# Patient Record
Sex: MALE | Race: WHITE | NOT HISPANIC OR LATINO | Employment: OTHER | URBAN - METROPOLITAN AREA
[De-identification: names, ages, dates, MRNs, and addresses within clinical notes are randomized per-mention and may not be internally consistent; named-entity substitution may affect disease eponyms.]

---

## 2019-03-01 ENCOUNTER — TRANSCRIBE ORDERS (OUTPATIENT)
Dept: ADMINISTRATIVE | Facility: HOSPITAL | Age: 49
End: 2019-03-01

## 2019-03-01 DIAGNOSIS — R94.39 ABNORMAL STRESS TEST: Primary | ICD-10-CM

## 2019-03-04 ENCOUNTER — HOSPITAL ENCOUNTER (OUTPATIENT)
Facility: HOSPITAL | Age: 49
Setting detail: HOSPITAL OUTPATIENT SURGERY
Discharge: HOME | End: 2019-03-04
Attending: INTERNAL MEDICINE | Admitting: INTERNAL MEDICINE

## 2019-03-04 VITALS
HEIGHT: 66 IN | DIASTOLIC BLOOD PRESSURE: 99 MMHG | BODY MASS INDEX: 32.95 KG/M2 | RESPIRATION RATE: 16 BRPM | OXYGEN SATURATION: 96 % | TEMPERATURE: 98 F | HEART RATE: 88 BPM | SYSTOLIC BLOOD PRESSURE: 129 MMHG | WEIGHT: 205.03 LBS

## 2019-03-04 DIAGNOSIS — R94.39 ABNORMAL STRESS TEST: ICD-10-CM

## 2019-03-04 LAB
ANION GAP SERPL CALCULATED.3IONS-SCNC: 9 MMOL/L (ref 3–11)
BUN BLD-MCNC: 14 MG/DL (ref 9–23)
BUN/CREAT SERPL: 13.3 (ref 7–25)
CALCIUM SPEC-SCNC: 9.8 MG/DL (ref 8.7–10.4)
CHLORIDE SERPL-SCNC: 100 MMOL/L (ref 99–109)
CO2 SERPL-SCNC: 28 MMOL/L (ref 20–31)
CREAT BLD-MCNC: 1.05 MG/DL (ref 0.6–1.3)
DEPRECATED RDW RBC AUTO: 41.1 FL (ref 37–54)
ERYTHROCYTE [DISTWIDTH] IN BLOOD BY AUTOMATED COUNT: 12.9 % (ref 11.3–14.5)
GFR SERPL CREATININE-BSD FRML MDRD: 74 ML/MIN/1.73
GLUCOSE BLD-MCNC: 113 MG/DL (ref 70–100)
GLUCOSE BLDC GLUCOMTR-MCNC: 101 MG/DL (ref 70–130)
HCT VFR BLD AUTO: 48.3 % (ref 38.9–50.9)
HGB BLD-MCNC: 16.1 G/DL (ref 13.1–17.5)
MCH RBC QN AUTO: 29.1 PG (ref 27–31)
MCHC RBC AUTO-ENTMCNC: 33.3 G/DL (ref 32–36)
MCV RBC AUTO: 87.3 FL (ref 80–99)
PLATELET # BLD AUTO: 268 10*3/MM3 (ref 150–450)
PMV BLD AUTO: 9.8 FL (ref 6–12)
POTASSIUM BLD-SCNC: 3.7 MMOL/L (ref 3.5–5.5)
RBC # BLD AUTO: 5.53 10*6/MM3 (ref 4.2–5.76)
SODIUM BLD-SCNC: 137 MMOL/L (ref 132–146)
WBC NRBC COR # BLD: 5.66 10*3/MM3 (ref 3.5–10.8)

## 2019-03-04 PROCEDURE — 25010000002 HEPARIN (PORCINE) PER 1000 UNITS: Performed by: INTERNAL MEDICINE

## 2019-03-04 PROCEDURE — 93005 ELECTROCARDIOGRAM TRACING: CPT | Performed by: INTERNAL MEDICINE

## 2019-03-04 PROCEDURE — C1894 INTRO/SHEATH, NON-LASER: HCPCS | Performed by: INTERNAL MEDICINE

## 2019-03-04 PROCEDURE — 80048 BASIC METABOLIC PNL TOTAL CA: CPT | Performed by: INTERNAL MEDICINE

## 2019-03-04 PROCEDURE — 36415 COLL VENOUS BLD VENIPUNCTURE: CPT

## 2019-03-04 PROCEDURE — 0 IOPAMIDOL PER 1 ML: Performed by: INTERNAL MEDICINE

## 2019-03-04 PROCEDURE — C1769 GUIDE WIRE: HCPCS | Performed by: INTERNAL MEDICINE

## 2019-03-04 PROCEDURE — 85027 COMPLETE CBC AUTOMATED: CPT | Performed by: INTERNAL MEDICINE

## 2019-03-04 PROCEDURE — 25010000002 FENTANYL CITRATE (PF) 100 MCG/2ML SOLUTION: Performed by: INTERNAL MEDICINE

## 2019-03-04 PROCEDURE — 25010000002 MIDAZOLAM PER 1 MG: Performed by: INTERNAL MEDICINE

## 2019-03-04 PROCEDURE — 93458 L HRT ARTERY/VENTRICLE ANGIO: CPT | Performed by: INTERNAL MEDICINE

## 2019-03-04 PROCEDURE — 82962 GLUCOSE BLOOD TEST: CPT

## 2019-03-04 RX ORDER — NICOTINE POLACRILEX 4 MG
15 LOZENGE BUCCAL
Status: DISCONTINUED | OUTPATIENT
Start: 2019-03-04 | End: 2019-03-04 | Stop reason: HOSPADM

## 2019-03-04 RX ORDER — SODIUM CHLORIDE 9 MG/ML
250 INJECTION, SOLUTION INTRAVENOUS CONTINUOUS
Status: ACTIVE | OUTPATIENT
Start: 2019-03-04 | End: 2019-03-04

## 2019-03-04 RX ORDER — LIDOCAINE HYDROCHLORIDE 10 MG/ML
INJECTION, SOLUTION EPIDURAL; INFILTRATION; INTRACAUDAL; PERINEURAL AS NEEDED
Status: DISCONTINUED | OUTPATIENT
Start: 2019-03-04 | End: 2019-03-04 | Stop reason: HOSPADM

## 2019-03-04 RX ORDER — ASPIRIN 81 MG/1
324 TABLET ORAL DAILY
COMMUNITY

## 2019-03-04 RX ORDER — MINOCYCLINE HYDROCHLORIDE 100 MG/1
100 CAPSULE ORAL EVERY MORNING
COMMUNITY

## 2019-03-04 RX ORDER — ASCORBIC ACID 125 MG
1 TABLET,CHEWABLE ORAL DAILY
COMMUNITY

## 2019-03-04 RX ORDER — VITAMIN B COMPLEX
2 CAPSULE ORAL DAILY
COMMUNITY

## 2019-03-04 RX ORDER — MELOXICAM 15 MG/1
15 TABLET ORAL EVERY MORNING
COMMUNITY

## 2019-03-04 RX ORDER — ACETAMINOPHEN 325 MG/1
650 TABLET ORAL EVERY 4 HOURS PRN
Status: DISCONTINUED | OUTPATIENT
Start: 2019-03-04 | End: 2019-03-04 | Stop reason: HOSPADM

## 2019-03-04 RX ORDER — HYDROCODONE BITARTRATE AND ACETAMINOPHEN 5; 325 MG/1; MG/1
1 TABLET ORAL EVERY 4 HOURS PRN
Status: DISCONTINUED | OUTPATIENT
Start: 2019-03-04 | End: 2019-03-04 | Stop reason: HOSPADM

## 2019-03-04 RX ORDER — ALPRAZOLAM 0.25 MG/1
0.25 TABLET ORAL 3 TIMES DAILY PRN
Status: DISCONTINUED | OUTPATIENT
Start: 2019-03-04 | End: 2019-03-04 | Stop reason: HOSPADM

## 2019-03-04 RX ORDER — TEMAZEPAM 7.5 MG/1
7.5 CAPSULE ORAL NIGHTLY PRN
Status: DISCONTINUED | OUTPATIENT
Start: 2019-03-04 | End: 2019-03-04 | Stop reason: HOSPADM

## 2019-03-04 RX ORDER — ASPIRIN 325 MG
325 TABLET, DELAYED RELEASE (ENTERIC COATED) ORAL DAILY
Status: DISCONTINUED | OUTPATIENT
Start: 2019-03-04 | End: 2019-03-04 | Stop reason: HOSPADM

## 2019-03-04 RX ORDER — MORPHINE SULFATE 2 MG/ML
1 INJECTION, SOLUTION INTRAMUSCULAR; INTRAVENOUS EVERY 4 HOURS PRN
Status: DISCONTINUED | OUTPATIENT
Start: 2019-03-04 | End: 2019-03-04 | Stop reason: HOSPADM

## 2019-03-04 RX ORDER — FENTANYL CITRATE 50 UG/ML
INJECTION, SOLUTION INTRAMUSCULAR; INTRAVENOUS AS NEEDED
Status: DISCONTINUED | OUTPATIENT
Start: 2019-03-04 | End: 2019-03-04 | Stop reason: HOSPADM

## 2019-03-04 RX ORDER — DEXTROSE MONOHYDRATE 25 G/50ML
25 INJECTION, SOLUTION INTRAVENOUS
Status: DISCONTINUED | OUTPATIENT
Start: 2019-03-04 | End: 2019-03-04 | Stop reason: HOSPADM

## 2019-03-04 RX ORDER — FLUTICASONE PROPIONATE 50 MCG
2 SPRAY, SUSPENSION (ML) NASAL EVERY MORNING
COMMUNITY

## 2019-03-04 RX ORDER — OXYMETAZOLINE HYDROCHLORIDE 0.05 G/100ML
2 SPRAY NASAL 2 TIMES DAILY PRN
COMMUNITY

## 2019-03-04 RX ORDER — NALOXONE HCL 0.4 MG/ML
0.4 VIAL (ML) INJECTION
Status: DISCONTINUED | OUTPATIENT
Start: 2019-03-04 | End: 2019-03-04 | Stop reason: HOSPADM

## 2019-03-04 RX ORDER — ROSUVASTATIN CALCIUM 40 MG/1
40 TABLET, COATED ORAL NIGHTLY
COMMUNITY

## 2019-03-04 RX ORDER — NITROGLYCERIN 0.4 MG/1
0.4 TABLET SUBLINGUAL
COMMUNITY

## 2019-03-04 RX ORDER — MONTELUKAST SODIUM 10 MG/1
10 TABLET ORAL EVERY MORNING
COMMUNITY

## 2019-03-04 RX ORDER — AMLODIPINE BESYLATE 5 MG/1
5 TABLET ORAL EVERY MORNING
COMMUNITY

## 2019-03-04 RX ORDER — OMEPRAZOLE 20 MG/1
20 CAPSULE, DELAYED RELEASE ORAL EVERY MORNING
COMMUNITY

## 2019-03-04 RX ORDER — AZELASTINE 1 MG/ML
2 SPRAY, METERED NASAL 2 TIMES DAILY
COMMUNITY

## 2019-03-04 RX ORDER — MIDAZOLAM HYDROCHLORIDE 1 MG/ML
INJECTION INTRAMUSCULAR; INTRAVENOUS AS NEEDED
Status: DISCONTINUED | OUTPATIENT
Start: 2019-03-04 | End: 2019-03-04 | Stop reason: HOSPADM

## 2019-03-04 RX ORDER — HYDROCHLOROTHIAZIDE 25 MG/1
25 TABLET ORAL EVERY MORNING
COMMUNITY

## 2019-03-04 RX ORDER — IPRATROPIUM BROMIDE 42 UG/1
2 SPRAY, METERED NASAL AS NEEDED
COMMUNITY

## 2019-03-04 RX ADMIN — ASPIRIN 325 MG: 325 TABLET, COATED ORAL at 12:27

## 2019-03-04 NOTE — H&P
Pre-Cardiac Catheterization Report  Cardiovascular Laboratory  [facility]      Patient:  Familia Brothers  :  1970  PCP:  Rob Magana MD  PHONE:  [phone]    DATE: 3/4/2019    BRIEF HPI:  Familia Brothers is a 49 y.o. male with hypertension, hypercholesterolemia, diabetes mellitus, and a family history of premature coronary artery disease.  He is complaining of a one year history of chest pain which he describes as a dull ache.  He states that it is been occurring weekly is moderate in severity lasting minutes and associated with shortness of breath and nausea.  He recently underwent an abnormal stress test and now presents for left heart catheterization with possible intervention.    Cardiac Risk Factors:  advanced age (older than 55 for men, 65 for women), diabetes mellitus, dyslipidemia, family history of premature cardiovascular disease, hypertension, male gender, obesity (BMI >= 30 kg/m2)    Anginal class in last 2 weeks:  CCS class III    CHF Class in last 2 weeks:  NYHA Class II    Cardiogenic shock:  no    Cardiac arrest <24 hours:  no    Stress test within last 6 months:   yes   Details:    Previous cardiac catheterization:  no  Details:     Previous CABG:  no  Details:      Allergies:     IV contrast allergy:  no  No Known Allergies    MEDICATIONS:  Prior to Admission medications    Medication Sig Start Date End Date Taking? Authorizing Provider   Acetaminophen (TYLENOL EXTRA STRENGTH PO) Take 500 mg by mouth As Needed.   Yes Neal Salinas MD   amLODIPine (NORVASC) 5 MG tablet Take 5 mg by mouth Every Morning.   Yes Neal Salinas MD   aspirin 81 MG EC tablet Take 324 mg by mouth Daily.   Yes Neal Salinas MD   B Complex Vitamins (VITAMIN B COMPLEX) capsule capsule Take 2 capsules by mouth Daily.   Yes Neal Salinas MD   Cyanocobalamin (B-12) 5000 MCG capsule Take 1 capsule by mouth Daily.   Yes Neal Salinas MD   fluticasone (FLONASE)  50 MCG/ACT nasal spray 2 sprays into the nostril(s) as directed by provider Every Morning.   Yes Neal Salinas MD   hydrochlorothiazide (HYDRODIURIL) 25 MG tablet Take 25 mg by mouth Every Morning.   Yes Neal Salinas MD   ipratropium (ATROVENT) 0.06 % nasal spray 2 sprays into the nostril(s) as directed by provider As Needed for Rhinitis.   Yes Neal Salinas MD   meloxicam (MOBIC) 15 MG tablet Take 15 mg by mouth Every Morning.   Yes Neal Salinas MD   metFORMIN (GLUCOPHAGE) 500 MG tablet Take 500 mg by mouth 2 (Two) Times a Day With Meals.   Yes Neal Salinas MD   minocycline (MINOCIN,DYNACIN) 100 MG capsule Take 100 mg by mouth Every Morning.   Yes Neal Salinas MD   montelukast (SINGULAIR) 10 MG tablet Take 10 mg by mouth Every Morning.   Yes Neal Salinas MD   Multiple Vitamins-Minerals (VITAMIN D3 COMPLETE PO) Take 6,000 Units by mouth Every Morning.   Yes Neal Salinas MD   omeprazole (priLOSEC) 20 MG capsule Take 20 mg by mouth Every Morning.   Yes Neal Salinas MD   oxymetazoline (AFRIN) 0.05 % nasal spray 2 sprays into the nostril(s) as directed by provider 2 (Two) Times a Day As Needed for Congestion.   Yes Neal Salinas MD   rosuvastatin (CRESTOR) 40 MG tablet Take 40 mg by mouth Every Night.   Yes Neal Salinas MD   nitroglycerin (NITROSTAT) 0.4 MG SL tablet Place 0.4 mg under the tongue Every 5 (Five) Minutes As Needed for Chest Pain. Take no more than 3 doses in 15 minutes.    Neal Salinas MD       Past medical & surgical history, social and family history reviewed in the electronic medical record.    ROS:  Cardiovascular ROS: positive for - chest pain, dyspnea on exertion and shortness of breath    Physical Exam:    Vitals:   Vitals:    03/04/19 1149   BP: 138/97   Pulse:    Resp:    Temp:    SpO2:           03/04/19  1148   Weight: 93 kg (205 lb 0.4 oz)       General Appearance:    Alert, cooperative, in  no acute distress   Head:    Normocephalic, without obvious abnormality, atraumatic   Eyes:            Lids and lashes normal, conjunctivae and sclerae normal, no   icterus, no pallor, corneas clear, PERRLA   Ears:    Ears appear intact with no abnormalities noted   Neck:   No adenopathy, supple, trachea midline, no thyromegaly, no   carotid bruit, no JVD   Back:     No kyphosis present, no scoliosis present, range of motion normal   Lungs:     Clear to auscultation,respirations regular, even and                  unlabored    Heart:    Regular rhythm and normal rate, normal S1 and S2, no            murmur, no gallop, no rub, no click   Chest Wall:    No abnormalities observed   Abdomen:     Normal bowel sounds, no masses, no organomegaly, soft        non-tender, non-distended, no guarding, no rebound                tenderness   Rectal:     Deferred   Extremities:   Moves all extremities well, no edema, no cyanosis, no             redness   Pulses:   Pulses palpable and equal bilaterally   Skin:   No bleeding, bruising or rash   Neurologic:   Cranial nerves 2 - 12 grossly intact, sensation intact     Barbaeu Test:  Left: Normal  (oxymetric Allens) Right: Not Assessed             No results found for: CHLPL, TRIG, HDL, LDLDIRECT, AST, ALT        Impression      · Abnormal stress test    Plan     · Procedure to perform: [facility]  · Planned access: Left radial artery              ARABELLA Dhillon  03/04/19  12:20 PM

## 2023-07-10 PROBLEM — J44.9 CHRONIC OBSTRUCTIVE LUNG DISEASE: Status: ACTIVE | Noted: 2023-05-09

## 2023-07-10 PROBLEM — K85.90 ACUTE PANCREATITIS: Status: ACTIVE | Noted: 2023-07-10

## 2023-07-10 PROBLEM — I10 HYPERTENSIVE DISORDER: Status: ACTIVE | Noted: 2023-05-09

## 2023-07-10 PROBLEM — Z99.89 OSA ON CPAP: Status: ACTIVE | Noted: 2023-07-10

## 2023-07-10 PROBLEM — E11.9 DIABETES MELLITUS: Status: ACTIVE | Noted: 2023-05-09

## 2023-07-10 PROBLEM — K21.9 GASTROESOPHAGEAL REFLUX DISEASE WITHOUT ESOPHAGITIS: Status: ACTIVE | Noted: 2023-05-09

## 2023-07-10 PROBLEM — G47.33 OSA ON CPAP: Status: ACTIVE | Noted: 2023-07-10

## 2023-07-10 PROBLEM — F10.10 ALCOHOL ABUSE: Status: ACTIVE | Noted: 2023-07-10

## 2023-07-10 PROBLEM — F41.1 GENERALIZED ANXIETY DISORDER: Status: ACTIVE | Noted: 2023-05-09

## 2023-07-10 PROBLEM — E78.5 HYPERLIPIDEMIA: Status: ACTIVE | Noted: 2023-05-09

## 2023-08-17 ENCOUNTER — OFFICE VISIT (OUTPATIENT)
Dept: INTERNAL MEDICINE | Facility: CLINIC | Age: 53
End: 2023-08-17
Payer: MEDICARE

## 2023-08-17 ENCOUNTER — LAB (OUTPATIENT)
Dept: INTERNAL MEDICINE | Facility: CLINIC | Age: 53
End: 2023-08-17
Payer: MEDICARE

## 2023-08-17 VITALS
HEART RATE: 88 BPM | TEMPERATURE: 97.5 F | DIASTOLIC BLOOD PRESSURE: 76 MMHG | BODY MASS INDEX: 31.72 KG/M2 | HEIGHT: 66 IN | SYSTOLIC BLOOD PRESSURE: 108 MMHG | WEIGHT: 197.4 LBS | OXYGEN SATURATION: 97 %

## 2023-08-17 DIAGNOSIS — Z12.11 SCREEN FOR COLON CANCER: ICD-10-CM

## 2023-08-17 DIAGNOSIS — E78.2 MIXED HYPERLIPIDEMIA: Primary | ICD-10-CM

## 2023-08-17 DIAGNOSIS — E11.9 ENCOUNTER FOR DIABETIC FOOT EXAM: ICD-10-CM

## 2023-08-17 DIAGNOSIS — Z12.11 ENCOUNTER FOR SCREENING COLONOSCOPY: ICD-10-CM

## 2023-08-17 DIAGNOSIS — L98.9 SKIN LESIONS: ICD-10-CM

## 2023-08-17 DIAGNOSIS — F41.8 MIXED ANXIETY AND DEPRESSIVE DISORDER: ICD-10-CM

## 2023-08-17 DIAGNOSIS — K21.9 GASTROESOPHAGEAL REFLUX DISEASE WITHOUT ESOPHAGITIS: ICD-10-CM

## 2023-08-17 DIAGNOSIS — Z00.00 HEALTH CARE MAINTENANCE: ICD-10-CM

## 2023-08-17 DIAGNOSIS — E11.9 TYPE 2 DIABETES MELLITUS WITHOUT COMPLICATION, WITHOUT LONG-TERM CURRENT USE OF INSULIN: ICD-10-CM

## 2023-08-17 DIAGNOSIS — G47.33 OSA (OBSTRUCTIVE SLEEP APNEA): ICD-10-CM

## 2023-08-17 LAB — HCV AB SER DONR QL: NORMAL

## 2023-08-17 PROCEDURE — 83036 HEMOGLOBIN GLYCOSYLATED A1C: CPT | Performed by: PHYSICIAN ASSISTANT

## 2023-08-17 PROCEDURE — 80053 COMPREHEN METABOLIC PANEL: CPT | Performed by: PHYSICIAN ASSISTANT

## 2023-08-17 PROCEDURE — 86803 HEPATITIS C AB TEST: CPT | Performed by: PHYSICIAN ASSISTANT

## 2023-08-17 PROCEDURE — 80061 LIPID PANEL: CPT | Performed by: PHYSICIAN ASSISTANT

## 2023-08-17 PROCEDURE — 85027 COMPLETE CBC AUTOMATED: CPT | Performed by: PHYSICIAN ASSISTANT

## 2023-08-17 PROCEDURE — 36415 COLL VENOUS BLD VENIPUNCTURE: CPT | Performed by: PHYSICIAN ASSISTANT

## 2023-08-17 PROCEDURE — 84443 ASSAY THYROID STIM HORMONE: CPT | Performed by: PHYSICIAN ASSISTANT

## 2023-08-17 PROCEDURE — 82043 UR ALBUMIN QUANTITATIVE: CPT | Performed by: PHYSICIAN ASSISTANT

## 2023-08-17 RX ORDER — BLOOD SUGAR DIAGNOSTIC
STRIP MISCELLANEOUS
COMMUNITY
Start: 2023-08-02

## 2023-08-17 RX ORDER — CHOLECALCIFEROL (VITAMIN D3) 125 MCG
5 CAPSULE ORAL
COMMUNITY

## 2023-08-17 RX ORDER — GLIMEPIRIDE 2 MG/1
2 TABLET ORAL
COMMUNITY
End: 2023-08-21 | Stop reason: SDUPTHER

## 2023-08-17 RX ORDER — BUSPIRONE HYDROCHLORIDE 5 MG/1
5 TABLET ORAL 3 TIMES DAILY PRN
Qty: 90 TABLET | Refills: 2 | Status: SHIPPED | OUTPATIENT
Start: 2023-08-17

## 2023-08-17 RX ORDER — TELMISARTAN 40 MG/1
1 TABLET ORAL DAILY
COMMUNITY

## 2023-08-17 RX ORDER — OMEPRAZOLE 40 MG/1
CAPSULE, DELAYED RELEASE ORAL
COMMUNITY

## 2023-08-18 LAB
ALBUMIN SERPL-MCNC: 4.8 G/DL (ref 3.5–5.2)
ALBUMIN UR-MCNC: <1.2 MG/DL
ALBUMIN/GLOB SERPL: 1.8 G/DL
ALP SERPL-CCNC: 48 U/L (ref 39–117)
ALT SERPL W P-5'-P-CCNC: 34 U/L (ref 1–41)
ANION GAP SERPL CALCULATED.3IONS-SCNC: 11.2 MMOL/L (ref 5–15)
AST SERPL-CCNC: 29 U/L (ref 1–40)
BILIRUB SERPL-MCNC: 0.3 MG/DL (ref 0–1.2)
BUN SERPL-MCNC: 13 MG/DL (ref 6–20)
BUN/CREAT SERPL: 15.3 (ref 7–25)
CALCIUM SPEC-SCNC: 10.1 MG/DL (ref 8.6–10.5)
CHLORIDE SERPL-SCNC: 102 MMOL/L (ref 98–107)
CHOLEST SERPL-MCNC: 170 MG/DL (ref 0–200)
CO2 SERPL-SCNC: 25.8 MMOL/L (ref 22–29)
CREAT SERPL-MCNC: 0.85 MG/DL (ref 0.76–1.27)
DEPRECATED RDW RBC AUTO: 39.6 FL (ref 37–54)
EGFRCR SERPLBLD CKD-EPI 2021: 102.6 ML/MIN/1.73
ERYTHROCYTE [DISTWIDTH] IN BLOOD BY AUTOMATED COUNT: 13 % (ref 12.3–15.4)
GLOBULIN UR ELPH-MCNC: 2.7 GM/DL
GLUCOSE SERPL-MCNC: 126 MG/DL (ref 65–99)
HBA1C MFR BLD: 7 % (ref 4.8–5.6)
HCT VFR BLD AUTO: 46.2 % (ref 37.5–51)
HDLC SERPL-MCNC: 38 MG/DL (ref 40–60)
HGB BLD-MCNC: 15.6 G/DL (ref 13–17.7)
LDLC SERPL CALC-MCNC: 89 MG/DL (ref 0–100)
LDLC/HDLC SERPL: 2.12 {RATIO}
MCH RBC QN AUTO: 28.9 PG (ref 26.6–33)
MCHC RBC AUTO-ENTMCNC: 33.8 G/DL (ref 31.5–35.7)
MCV RBC AUTO: 85.6 FL (ref 79–97)
PLATELET # BLD AUTO: 250 10*3/MM3 (ref 140–450)
PMV BLD AUTO: 10.2 FL (ref 6–12)
POTASSIUM SERPL-SCNC: 4.4 MMOL/L (ref 3.5–5.2)
PROT SERPL-MCNC: 7.5 G/DL (ref 6–8.5)
RBC # BLD AUTO: 5.4 10*6/MM3 (ref 4.14–5.8)
SODIUM SERPL-SCNC: 139 MMOL/L (ref 136–145)
TRIGL SERPL-MCNC: 258 MG/DL (ref 0–150)
TSH SERPL DL<=0.05 MIU/L-ACNC: 0.83 UIU/ML (ref 0.27–4.2)
VLDLC SERPL-MCNC: 43 MG/DL (ref 5–40)
WBC NRBC COR # BLD: 4.98 10*3/MM3 (ref 3.4–10.8)

## 2023-08-21 RX ORDER — GLIMEPIRIDE 4 MG/1
4 TABLET ORAL
Qty: 90 TABLET | Refills: 1 | Status: SHIPPED | OUTPATIENT
Start: 2023-08-21

## 2023-08-29 NOTE — PROGRESS NOTES
Sleep Clinic Video Visit Consult Note    You have chosen to receive care through a telehealth visit.  Do you consent to use a video/audio connection for your medical care today? Yes     Chief Complaint  Sleep problem    Subjective     History of Present Illness:  Familia Brothers is a 53 y.o. male with a history of hypertension, hyperlipidemia, diabetes mellitus, pancreatitis, arthritis, and depression.  The patient is referred by Tio Gallegos PA-C.  He recently reported a history of ALMA not on PAP therapy. He was diagnosed several years ago having been seen at [facility]. He did try a machine but had difficulty with it.     Further details are as follows:    Randolph Scale is (out of 24):       Estimated average amount of sleep per night: 5-6 hours  Number of times he wakes up at night: 7-8 times due to arthritis  Difficulty falling back asleep: yes  It usually takes 60 minutes to go to sleep.  He feels sleepy upon waking up: yes  Rotating or night shift work: no    Drowsiness/Sleepiness:  He exhibits the following:  excessive daytime sleepiness  excessive daytime fatigue    Snoring/Breathing:  He exhibits the following:  loud snoring  snores in all sleep positions  awoken with dry mouth  quits breathing at night    Head Injury:  He exhibits the following:  No    Reflux:  He describes the following:  takes medication for reflux    Narcolepsy:  He exhibits the following:  none    RLS/PLMs:  He describes the following:  moves or jerks during sleep  discomfort in legs with an urge to move them    Insomnia:  He describes the following:  problems initiating sleep at night  frequent awakenings  bothered by pain at night    Parasomnia:  He exhibits the following:  grinds teeth    Weight:  Weight change in the last year:  loss: 15 lbs    The patient's relevant past medical, surgical, family, and social history reviewed and updated in Epic as appropriate.    Review of Systems    Objective   Vital Signs:  Ht 170.2 cm  "(67\")   Wt 87.1 kg (192 lb)   BMI 30.07 kg/mý     BMI is >= 30 and <35. (Class 1 Obesity). The following options were offered after discussion;: he has difficulty with exercise but is watching diet.        Physical Exam  Constitutional:       Appearance: Normal appearance.   Neurological:      Mental Status: He is alert and oriented to person, place, and time.   Psychiatric:         Mood and Affect: Mood normal.         Behavior: Behavior normal.         Thought Content: Thought content normal.         Judgment: Judgment normal.           Result Review :              Assessment and Plan  Familia Brothers is a 53 y.o. male who presents for further evaluation of excessive daytime and sleepiness and fatigue, nonrestorative sleep, and concerns for sleep disordered breathing and obstructive sleep apnea.  Patient has a remote history of sleep apnea diagnosis for which he was prescribed PAP therapy.  He was unable to tolerate it at that time.  His symptoms continue to be concerning for sleep disordered breathing obstructive sleep apnea.  We will obtain a home sleep test for further evaluation.  The patient will return for follow-up and recommendations after test.  I have discussed weight loss as it pertains to obstructive sleep apnea.    Diagnoses and all orders for this visit:    1. Snoring (Primary)  -     Home Sleep Study; Future    2. Suspected sleep apnea  -     Home Sleep Study; Future    3. Excessive daytime sleepiness  -     Home Sleep Study; Future    4. Primary hypertension    5. Obesity (BMI 30.0-34.9)                 I discussed the consequences of uncontrolled sleep apnea including hypertension, heart disease, diabetes, stroke, and dementia. I further discussed sleep apnea therapeutic options including CPAP, Weight loss, Oral dental appliance, and surgery.         Follow Up  Return for Follow up after study.  Patient was given instructions and counseling regarding his condition or for health maintenance " advice. Please see specific information pulled into the AVS if appropriate.     ED Briones, ACNP-BC  Pulmonary, Critical Care Medicine, and Sleep Medicine

## 2023-08-31 ENCOUNTER — TELEMEDICINE (OUTPATIENT)
Dept: SLEEP MEDICINE | Facility: HOSPITAL | Age: 53
End: 2023-08-31
Payer: MEDICARE

## 2023-08-31 VITALS — WEIGHT: 192 LBS | BODY MASS INDEX: 30.13 KG/M2 | HEIGHT: 67 IN

## 2023-08-31 DIAGNOSIS — R06.83 SNORING: Primary | ICD-10-CM

## 2023-08-31 DIAGNOSIS — I10 PRIMARY HYPERTENSION: ICD-10-CM

## 2023-08-31 DIAGNOSIS — G47.19 EXCESSIVE DAYTIME SLEEPINESS: ICD-10-CM

## 2023-08-31 DIAGNOSIS — E66.9 OBESITY (BMI 30.0-34.9): ICD-10-CM

## 2023-08-31 DIAGNOSIS — R29.818 SUSPECTED SLEEP APNEA: ICD-10-CM

## 2023-08-31 RX ORDER — TELMISARTAN 40 MG/1
40 TABLET ORAL DAILY
Qty: 90 TABLET | Refills: 1 | Status: SHIPPED | OUTPATIENT
Start: 2023-08-31

## 2023-08-31 RX ORDER — HYDROCHLOROTHIAZIDE 25 MG/1
25 TABLET ORAL EVERY MORNING
Qty: 90 TABLET | Refills: 1 | Status: SHIPPED | OUTPATIENT
Start: 2023-08-31

## 2023-09-13 ENCOUNTER — TELEPHONE (OUTPATIENT)
Dept: INTERNAL MEDICINE | Facility: CLINIC | Age: 53
End: 2023-09-13

## 2023-09-13 NOTE — TELEPHONE ENCOUNTER
"Caller: Familia Brothers \"Dae\"    Relationship: Self    Best call back number: [phone]     What medications are you currently taking:   Current Outpatient Medications on File Prior to Visit   Medication Sig Dispense Refill    Accu-Chek Guide test strip       Acetaminophen (TYLENOL EXTRA STRENGTH PO) Take 500 mg by mouth As Needed.      B Complex Vitamins (VITAMIN B COMPLEX) capsule capsule Take 2 capsules by mouth Daily.      busPIRone (BUSPAR) 5 MG tablet Take 1 tablet by mouth 3 (Three) Times a Day As Needed (anxiety). 90 tablet 2    Calcium Magnesium Zinc 333-133-5 MG tablet Take  by mouth.      Cyanocobalamin (B-12) 5000 MCG capsule Take 1 capsule by mouth Daily.      fluticasone (FLONASE) 50 MCG/ACT nasal spray 2 sprays into the nostril(s) as directed by provider Every Morning.      folic acid (FOLVITE) 1 MG tablet Take 1 tablet by mouth Daily. 30 tablet 1    glimepiride (AMARYL) 4 MG tablet Take 1 tablet by mouth Every Morning Before Breakfast. 90 tablet 1    hydroCHLOROthiazide (HYDRODIURIL) 25 MG tablet Take 1 tablet by mouth Every Morning. 90 tablet 1    melatonin 5 MG tablet tablet Take 1 tablet by mouth.      meloxicam (MOBIC) 15 MG tablet Take 1 tablet by mouth Every Morning.      metFORMIN (GLUCOPHAGE) 500 MG tablet Take 1 tablet by mouth 2 (Two) Times a Day With Meals. 180 tablet 0    montelukast (SINGULAIR) 10 MG tablet Take 1 tablet by mouth Every Morning.      Multiple Vitamins-Minerals (VITAMIN D3 COMPLETE PO) Take 6,000 Units by mouth Every Morning.      omeprazole (priLOSEC) 40 MG capsule Take 1 capsule every day by oral route.      rosuvastatin (CRESTOR) 40 MG tablet Take 1 tablet by mouth Every Night.      telmisartan (MICARDIS) 40 MG tablet Take 1 tablet by mouth Daily. 90 tablet 1    thiamine (VITAMIN B1) 100 MG tablet Take 1 tablet by mouth Daily. 30 tablet 1     No current facility-administered medications on file prior to visit.          When did you start taking these medications: " 8/17/23    Which medication are you concerned about: BUSPIRONE    Who prescribed you this medication: PHILIPPE HOGAN    What are your concerns: PATIENT STATES THAT IT'S NOT WORKING FOR HIM. HE HASN'T HAD ANY IMPROVEMENT.    How long have you had these concerns: SINCE HE STARTED TAKING IT.    PATIENT WOULD LIKE TO TO TRY THE OTHER MEDICATION THAT WAS DISCUSSED AT HIS MOST RECENT VISIT.

## 2023-09-14 RX ORDER — ESCITALOPRAM OXALATE 5 MG/1
5 TABLET ORAL DAILY
Qty: 30 TABLET | Refills: 5 | Status: SHIPPED | OUTPATIENT
Start: 2023-09-14

## 2023-12-21 ENCOUNTER — TELEPHONE (OUTPATIENT)
Dept: GASTROENTEROLOGY | Facility: CLINIC | Age: 53
End: 2023-12-21
Payer: MEDICARE

## 2023-12-21 NOTE — TELEPHONE ENCOUNTER
"Hub staff attempted to follow warm transfer process and was unsuccessful     Caller: Familia Brothers \"Dae\"    Relationship to patient: Self    Best call back number: [phone]    Patient is needing: PT CALLED STATING THAT HE WOULD LIKE TO CANCEL HIS PROCEDURE SCHEDULED FOR 1/18/2024// PT IS BEING SEEN AT [facility]      "

## 2024-06-22 NOTE — PROGRESS NOTES
"[facility]  [address]  [place] [address]  Dept: [phone]  Dept Fax: [phone]  Loc: [phone]  Loc Fax: [phone]    Familia Brothers  1970    New Patient Office Note    History of Present Illness:  Patient is a 53-year-old male in today to establish care for hyperlipidemia, diabetes, mixed depression and anxiety, and GERD.  Patient on Crestor 40 mg nightly.  Needing FLP rechecked.    Patient on glimepiride and metformin for diabetes.  Was recommended at his previous hospital admission that he stopped the metformin due to liver disease.  Hospital course was as follows: \"Familia Brothers is a 53 y.o. male with past medical history of regular alcohol use, ALMA on CPAP, COPD, GERD, type 2 diabetes, anxiety disorder, hypertensive eating, hyperlipidemia, who was recently started on Victoza outpatient who was hospitalized at [facility] over the past 3 days with pancreatitis.  Returns to our ER with complaints of worsening pain.     Acute pancreatitis  --pain and nausea control, IVF  --CLD advance a tolerated  --suspect most likely related to combo of alcohol and victoza  --Victoza and all glp 1-agonists now contraindicated  -- Stop metformin d/t liver disease  --Follow-up in 1 week with family doctor to recheck labs and possibly restart metformin     Elevated liver enzymes/bili-improving  Hepatic steatosis  --appears to be consistent with alcoholic hepatitis  --us gallbladder negative for stones. Gallbladder duct on CT is normal.     Alcohol use  --CIWA  --Cessation counseling to help patient stop drinking alcohol  --Discharged on folate and thiamine     ALMA  --does not use cpap     DMII:  --Hold metformin at discharge until LFTs have improved  --Victoza and all glp 1-agonists now contraindicated     Other chronic conditions: HTN, HLD, GERD, COPD  --resume home regimen     Patient states he ready to be discharged home as " Left vm for patient to call office.   "long as he has some pain medicine d/t having intermittent pain.  Discharge follow-up recommendations and appointments are listed below.\"  Patient has not had any more issues since being discharged.  No longer taking Victoza.  Needing A1c rechecked.    Not taking anything for mixed anxiety and depression.  Patient states that he has not ever been anxious or depressed but currently going through divorce and has been more depressed and anxious lately.  Patient was  for 22 years with 3 kids.  2 of whom are grown now.    Patient on omeprazole 40 mg daily for acid reflux.  Reports good symptomatic control.    Patient also has some skin lesions on the back of his neck and scalp for which he would like referral to dermatology.    Patient has a history of ALMA currently not on CPAP.  Patient states that he got a machine with a full facemask and could not tolerate this.    Patient overall doing well and feeling well today.          The following portions of the patient's history were reviewed and updated as appropriate: allergies, current medications, past family history, past medical history, past social history, past surgical history, and problem list.    Medications:    Current Outpatient Medications:     Accu-Chek Guide test strip, , Disp: , Rfl:     Acetaminophen (TYLENOL EXTRA STRENGTH PO), Take 500 mg by mouth As Needed., Disp: , Rfl:     B Complex Vitamins (VITAMIN B COMPLEX) capsule capsule, Take 2 capsules by mouth Daily., Disp: , Rfl:     Calcium Magnesium Zinc 333-133-5 MG tablet, Take  by mouth., Disp: , Rfl:     Cyanocobalamin (B-12) 5000 MCG capsule, Take 1 capsule by mouth Daily., Disp: , Rfl:     fluticasone (FLONASE) 50 MCG/ACT nasal spray, 2 sprays into the nostril(s) as directed by provider Every Morning., Disp: , Rfl:     folic acid (FOLVITE) 1 MG tablet, Take 1 tablet by mouth Daily., Disp: 30 tablet, Rfl: 1    glimepiride (AMARYL) 2 MG tablet, Take 1 tablet by mouth., Disp: , Rfl:     " hydrochlorothiazide (HYDRODIURIL) 25 MG tablet, Take 1 tablet by mouth Every Morning., Disp: , Rfl:     melatonin 5 MG tablet tablet, Take 1 tablet by mouth., Disp: , Rfl:     meloxicam (MOBIC) 15 MG tablet, Take 1 tablet by mouth Every Morning., Disp: , Rfl:     metFORMIN (GLUCOPHAGE) 1000 MG tablet, , Disp: , Rfl:     montelukast (SINGULAIR) 10 MG tablet, Take 1 tablet by mouth Every Morning., Disp: , Rfl:     Multiple Vitamins-Minerals (VITAMIN D3 COMPLETE PO), Take 6,000 Units by mouth Every Morning., Disp: , Rfl:     omeprazole (priLOSEC) 40 MG capsule, Take 1 capsule every day by oral route., Disp: , Rfl:     rosuvastatin (CRESTOR) 40 MG tablet, Take 1 tablet by mouth Every Night., Disp: , Rfl:     telmisartan (MICARDIS) 40 MG tablet, Take 1 tablet by mouth Daily., Disp: , Rfl:     thiamine (VITAMIN B1) 100 MG tablet, Take 1 tablet by mouth Daily., Disp: 30 tablet, Rfl: 1    busPIRone (BUSPAR) 5 MG tablet, Take 1 tablet by mouth 3 (Three) Times a Day As Needed (anxiety)., Disp: 90 tablet, Rfl: 2    Subjective  Allergies   Allergen Reactions    Oxycodone Hives and Itching        Past Medical History:   Diagnosis Date    Arthritis     Depression     Diabetes mellitus     GERD (gastroesophageal reflux disease)     History of placement of ear tubes     AS A CHILD    Hyperlipidemia     Hypertension     ALMA on CPAP     Pancreatitis     PONV (postoperative nausea and vomiting)        Past Surgical History:   Procedure Laterality Date    CARDIAC CATHETERIZATION N/A 03/04/2019    Procedure: Left Heart Cath;  Surgeon: Santiago Ni MD;  Location: [facility] CATH INVASIVE [facility];  Service: Cardiology    HEMORRHOIDECTOMY         Family History   Problem Relation Age of Onset    Arthritis Mother     Diabetes Mother     Heart attack Mother     Hyperlipidemia Mother     Osteoporosis Mother     Arthritis Father     Hypertension Father     Cancer Paternal Grandfather         Social History     Socioeconomic History     "Marital status:    Tobacco Use    Smoking status: Former     Types: Cigarettes     Quit date:      Years since quittin.6    Smokeless tobacco: Former     Quit date:    Vaping Use    Vaping Use: Never used   Substance and Sexual Activity    Alcohol use: Not Currently     Alcohol/week: 4.0 standard drinks     Types: 4 Cans of beer per week    Drug use: No    Sexual activity: Yes     Partners: Female       Review of Systems   Constitutional:  Negative for activity change, chills, fatigue, fever and unexpected weight change.   HENT:  Negative for congestion, ear pain, postnasal drip, sinus pressure and sore throat.    Eyes:  Negative for pain, discharge and redness.   Respiratory:  Negative for cough, shortness of breath and wheezing.    Cardiovascular:  Negative for chest pain, palpitations and leg swelling.   Gastrointestinal:  Negative for diarrhea, nausea and vomiting.   Endocrine: Negative for cold intolerance and heat intolerance.   Genitourinary:  Negative for decreased urine volume and dysuria.   Musculoskeletal:  Negative for arthralgias and myalgias.   Skin:  Negative for rash and wound.   Neurological:  Negative for dizziness, light-headedness and headaches.   Hematological:  Does not bruise/bleed easily.   Psychiatric/Behavioral:  Negative for confusion, dysphoric mood and sleep disturbance. The patient is not nervous/anxious.      Objective  Vitals:    23 1442   BP: 108/76   Pulse: 88   Temp: 97.5 øF (36.4 øC)   SpO2: 97%   Weight: 89.5 kg (197 lb 6.4 oz)   Height: 167.6 cm (65.98\")       Physical Exam  Physical Exam  Vitals and nursing note reviewed.   Constitutional:       General: He is not in acute distress.     Appearance: He is not ill-appearing.   HENT:      Head: Normocephalic.      Right Ear: Tympanic membrane, ear canal and external ear normal. There is no impacted cerumen.      Left Ear: Tympanic membrane, ear canal and external ear normal. There is no impacted cerumen. "      Nose: No congestion or rhinorrhea.      Mouth/Throat:      Mouth: Mucous membranes are moist.      Pharynx: Oropharynx is clear. No oropharyngeal exudate or posterior oropharyngeal erythema.   Eyes:      General:         Right eye: No discharge.         Left eye: No discharge.      Extraocular Movements: Extraocular movements intact.      Conjunctiva/sclera: Conjunctivae normal.      Pupils: Pupils are equal, round, and reactive to light.   Cardiovascular:      Rate and Rhythm: Normal rate and regular rhythm.      Heart sounds: Normal heart sounds. No murmur heard.    No friction rub. No gallop.   Pulmonary:      Effort: Pulmonary effort is normal. No respiratory distress.      Breath sounds: Normal breath sounds. No wheezing.   Abdominal:      General: Bowel sounds are normal. There is no distension.      Palpations: Abdomen is soft. There is no mass.      Tenderness: There is no abdominal tenderness.   Musculoskeletal:         General: No swelling. Normal range of motion.      Cervical back: Normal range of motion. No tenderness.      Right lower leg: No edema.      Left lower leg: No edema.   Lymphadenopathy:      Cervical: No cervical adenopathy.   Skin:     Findings: No bruising, erythema or rash.   Neurological:      Mental Status: He is oriented to person, place, and time.      Gait: Gait normal.   Psychiatric:         Mood and Affect: Mood normal.         Behavior: Behavior normal.         Thought Content: Thought content normal.         Judgment: Judgment normal.       Diagnostic Data  Procedures    Assessment  Diagnoses and all orders for this visit:    1. Mixed hyperlipidemia (Primary)    2. Type 2 diabetes mellitus without complication, without long-term current use of insulin  -     MicroAlbumin, Urine, Random - Urine, Clean Catch; Future    3. Gastroesophageal reflux disease without esophagitis    4. Health care maintenance  -     CBC (No Diff)  -     Comprehensive Metabolic Panel  -     TSH Rfx  On Abnormal To Free T4  -     Hemoglobin A1c; Future  -     Lipid Panel  -     Hepatitis C Antibody    5. Encounter for screening colonoscopy    6. Encounter for diabetic foot exam  -     Ambulatory Referral to Podiatry    7. Screen for colon cancer  -     Cancel: Cologuard - Stool, Per Rectum; Future  -     Ambulatory Referral to Gastroenterology    8. Mixed anxiety and depressive disorder    9. ALMA (obstructive sleep apnea)  -     Ambulatory Referral to Sleep Medicine    10. Skin lesions  -     Ambulatory Referral to Dermatology    Other orders  -     busPIRone (BUSPAR) 5 MG tablet; Take 1 tablet by mouth 3 (Three) Times a Day As Needed (anxiety).  Dispense: 90 tablet; Refill: 2        Plan    1. Mixed hyperlipidemia (Primary)- on Crestor 40 mg nightly.  Repeat FLP.    2. Type 2 diabetes mellitus without complication, without long-term current use of insulin- obtain microalbumin.  Repeat A1c.  Continue metformin and glimepiride for now.    3. Gastroesophageal reflux disease without esophagitis- well-controlled on omeprazole 40 mg daily.  Keep same.  Continue to monitor.    4. Health care maintenance- obtain fasting labs and follow-up with these.    5. Encounter for screening colonoscopy- referred to GI.  Has history of colon polyps.    6. Encounter for diabetic foot exam- referred to podiatry.    7. Screen for colon cancer- refer to GI.    8. Mixed anxiety and depressive disorder- worse, start BuSpar 5 mg 3 times daily as needed.  Advised for any side effects to call back immediately.    9. ALMA (obstructive sleep apnea)- Ambulatory Referral to Sleep Medicine    10. Skin lesions- Ambulatory Referral to Dermatology      Return in about 4 weeks (around 9/14/2023) for Medicare Wellness.    Tio Gallegos PA-C  08/17/2023

## (undated) DEVICE — INTRO SHEATH PRELUDE IDEAL SPRNG COIL 021 6F 23X80CM

## (undated) DEVICE — CATH DIAG EXPO M/ PK 6FR FL4/FR4 PIG 3PK

## (undated) DEVICE — CATH DIAG EXPO .056 FL3.5 6F 100CM

## (undated) DEVICE — MODEL BT2000 P/N 700287-012KIT CONTENTS: MANIFOLD WITH SALINE AND CONTRAST PORTS, SALINE TUBING WITH SPIKE AND HAND SYRINGE, TRANSDUCER: Brand: BT2000 AUTOMATED MANIFOLD KIT

## (undated) DEVICE — MODEL AT P65, P/N 701554-001KIT CONTENTS: HAND CONTROLLER, 3-WAY HIGH-PRESSURE STOPCOCK WITH ROTATING END AND PREMIUM HIGH-PRESSURE TUBING: Brand: ANGIOTOUCH® KIT

## (undated) DEVICE — GW FC FLOP/TP .035 260CM 3MM

## (undated) DEVICE — PK CATH CARD 10

## (undated) DEVICE — DEV COMP RAD PRELUDESYNC 24CM